# Patient Record
Sex: FEMALE | Race: WHITE | NOT HISPANIC OR LATINO
[De-identification: names, ages, dates, MRNs, and addresses within clinical notes are randomized per-mention and may not be internally consistent; named-entity substitution may affect disease eponyms.]

---

## 2023-04-18 PROBLEM — Z00.00 ENCOUNTER FOR PREVENTIVE HEALTH EXAMINATION: Status: ACTIVE | Noted: 2023-04-18

## 2023-04-21 ENCOUNTER — APPOINTMENT (OUTPATIENT)
Dept: VASCULAR SURGERY | Facility: CLINIC | Age: 73
End: 2023-04-21
Payer: MEDICARE

## 2023-04-21 PROCEDURE — 29580 STRAPPING UNNA BOOT: CPT

## 2023-04-21 PROCEDURE — 99203 OFFICE O/P NEW LOW 30 MIN: CPT | Mod: 25

## 2023-06-02 ENCOUNTER — APPOINTMENT (OUTPATIENT)
Dept: VASCULAR SURGERY | Facility: CLINIC | Age: 73
End: 2023-06-02
Payer: MEDICARE

## 2023-06-02 PROCEDURE — 99214 OFFICE O/P EST MOD 30 MIN: CPT

## 2023-07-27 ENCOUNTER — APPOINTMENT (OUTPATIENT)
Dept: VASCULAR SURGERY | Facility: CLINIC | Age: 73
End: 2023-07-27

## 2023-07-27 VITALS — DIASTOLIC BLOOD PRESSURE: 81 MMHG | SYSTOLIC BLOOD PRESSURE: 137 MMHG

## 2023-07-27 DIAGNOSIS — Z82.3 FAMILY HISTORY OF STROKE: ICD-10-CM

## 2023-07-27 RX ORDER — LEVOTHYROXINE SODIUM 0.1 MG/1
100 TABLET ORAL
Refills: 0 | Status: ACTIVE | COMMUNITY

## 2023-07-27 RX ORDER — LISINOPRIL 20 MG/1
20 TABLET ORAL
Refills: 0 | Status: ACTIVE | COMMUNITY

## 2023-07-27 RX ORDER — ASPIRIN 81 MG
81 TABLET, DELAYED RELEASE (ENTERIC COATED) ORAL
Refills: 0 | Status: ACTIVE | COMMUNITY

## 2023-07-27 RX ORDER — CALCIUM CARBONATE 600 MG
TABLET ORAL
Refills: 0 | Status: ACTIVE | COMMUNITY

## 2023-07-27 RX ORDER — ROSUVASTATIN CALCIUM 40 MG/1
40 TABLET, FILM COATED ORAL
Refills: 0 | Status: ACTIVE | COMMUNITY

## 2023-08-03 ENCOUNTER — APPOINTMENT (OUTPATIENT)
Dept: VASCULAR SURGERY | Facility: CLINIC | Age: 73
End: 2023-08-03
Payer: MEDICARE

## 2023-08-03 ENCOUNTER — APPOINTMENT (OUTPATIENT)
Dept: VASCULAR SURGERY | Facility: CLINIC | Age: 73
End: 2023-08-03

## 2023-08-03 VITALS — SYSTOLIC BLOOD PRESSURE: 127 MMHG | DIASTOLIC BLOOD PRESSURE: 77 MMHG | HEART RATE: 73 BPM

## 2023-08-03 DIAGNOSIS — E11.9 TYPE 2 DIABETES MELLITUS W/OUT COMPLICATIONS: ICD-10-CM

## 2023-08-03 DIAGNOSIS — E78.5 HYPERLIPIDEMIA, UNSPECIFIED: ICD-10-CM

## 2023-08-03 PROCEDURE — 99213 OFFICE O/P EST LOW 20 MIN: CPT

## 2023-08-28 PROBLEM — E11.9 DIABETES: Status: ACTIVE | Noted: 2023-07-27

## 2023-08-28 PROBLEM — E78.5 HYPERLIPIDEMIA: Status: ACTIVE | Noted: 2023-07-27

## 2023-08-28 NOTE — PHYSICAL EXAM
[Normal Breath Sounds] : Normal breath sounds [2+] : left 2+ [Alert] : alert [Oriented to Person] : oriented to person [Oriented to Place] : oriented to place [Oriented to Time] : oriented to time [Calm] : calm [JVD] : no jugular venous distention  [Abdomen Masses] : No abdominal masses [de-identified] : NAD [de-identified] : NCAT [de-identified] : supple [de-identified] : Soft, NT, ND. [de-identified] : bilateral legs with scattered eczematous patches

## 2023-08-28 NOTE — HISTORY OF PRESENT ILLNESS
[FreeTextEntry1] : 73-year-old female here for evaluation after recent urgent care visit for significant bleeding from left lower extremity varicose vein. Patient has a history of LLE edema, due to significant pruitus, she scratches her leg frequently. She has had bleeding episodes from both lower extremities in the past for which she had undergo cauterization. Several days ago, she had significant bleeding from an apparent varicosity of the left lower leg which prompted an urgent care visit and placement of a figure-of-eight suture. She reports that she has maintained a compression garment that was applied at the urgent care center. [de-identified] : 6/2/23 - She is doing well. She saw me at the wound care center for Unna boot treatment of her LLE ulcerations, which healed quickly. No recurrence. Continues to have itching over her varicose veins of both lower extremities. Underwent venous US.  8/3/23 - Patient continues to do well and her ulcerations remain healed. The patient reports she underwent a venous procedure in 2004.

## 2023-08-28 NOTE — ASSESSMENT
[FreeTextEntry1] : 73-year-old female with lower extremity eczema, atopic dermatitis, with significant pruitus, recent history of significant bleeding varicosity which prompted an urgent care visit for placement of suture.  Although her previous ultrasound demonstrated GSV reflux, ablation was not feasible secondary to a very small vein, with also a thick scarred wall and negative areas of thrombosis. I discussed with her these findings as well as the fact that she would not benefit from ablative treatment.  Her leg is in good shape currently, edema is very well controlled, venous ulcers are all healed.  She will continue the use of compression on a daily basis.

## 2023-08-28 NOTE — PROCEDURE
[FreeTextEntry1] : Left lower extremity venous duplex performed at our [facility] lab -   Consistent with prior ablation of GSV

## 2023-08-28 NOTE — REVIEW OF SYSTEMS
[Fever] : no fever [Chills] : no chills [Leg Claudication] : no intermittent leg claudication [Joint Swelling] : no joint swelling [Joint Stiffness] : no joint stiffness